# Patient Record
Sex: MALE | Race: OTHER | NOT HISPANIC OR LATINO | URBAN - METROPOLITAN AREA
[De-identification: names, ages, dates, MRNs, and addresses within clinical notes are randomized per-mention and may not be internally consistent; named-entity substitution may affect disease eponyms.]

---

## 2019-03-25 ENCOUNTER — INPATIENT (INPATIENT)
Facility: HOSPITAL | Age: 54
LOS: 0 days | Discharge: HOME | DRG: 639 | End: 2019-03-26
Attending: INTERNAL MEDICINE | Admitting: HOSPITALIST
Payer: COMMERCIAL

## 2019-03-25 VITALS
DIASTOLIC BLOOD PRESSURE: 76 MMHG | OXYGEN SATURATION: 95 % | TEMPERATURE: 98 F | WEIGHT: 149.91 LBS | SYSTOLIC BLOOD PRESSURE: 152 MMHG | HEART RATE: 87 BPM | RESPIRATION RATE: 16 BRPM | HEIGHT: 68 IN

## 2019-03-25 DIAGNOSIS — R73.9 HYPERGLYCEMIA, UNSPECIFIED: ICD-10-CM

## 2019-03-25 LAB
ALBUMIN SERPL ELPH-MCNC: 4.2 G/DL — SIGNIFICANT CHANGE UP (ref 3.3–5)
ALP SERPL-CCNC: 101 U/L — SIGNIFICANT CHANGE UP (ref 40–120)
ALT FLD-CCNC: 25 U/L — SIGNIFICANT CHANGE UP (ref 10–45)
ANION GAP SERPL CALC-SCNC: 14 MMOL/L — SIGNIFICANT CHANGE UP (ref 5–17)
APTT BLD: 27.8 SEC — SIGNIFICANT CHANGE UP (ref 27.5–36.3)
AST SERPL-CCNC: 18 U/L — SIGNIFICANT CHANGE UP (ref 10–40)
BASOPHILS # BLD AUTO: 0 K/UL — SIGNIFICANT CHANGE UP (ref 0–0.2)
BASOPHILS NFR BLD AUTO: 0.5 % — SIGNIFICANT CHANGE UP (ref 0–2)
BILIRUB SERPL-MCNC: 0.2 MG/DL — SIGNIFICANT CHANGE UP (ref 0.2–1.2)
BLD GP AB SCN SERPL QL: NEGATIVE — SIGNIFICANT CHANGE UP
BUN SERPL-MCNC: 12 MG/DL — SIGNIFICANT CHANGE UP (ref 7–23)
CALCIUM SERPL-MCNC: 9.5 MG/DL — SIGNIFICANT CHANGE UP (ref 8.4–10.5)
CHLORIDE SERPL-SCNC: 98 MMOL/L — SIGNIFICANT CHANGE UP (ref 96–108)
CO2 SERPL-SCNC: 23 MMOL/L — SIGNIFICANT CHANGE UP (ref 22–31)
CREAT SERPL-MCNC: 0.85 MG/DL — SIGNIFICANT CHANGE UP (ref 0.5–1.3)
EOSINOPHIL # BLD AUTO: 0.3 K/UL — SIGNIFICANT CHANGE UP (ref 0–0.5)
EOSINOPHIL NFR BLD AUTO: 2.4 % — SIGNIFICANT CHANGE UP (ref 0–6)
GLUCOSE SERPL-MCNC: 478 MG/DL — CRITICAL HIGH (ref 70–99)
HCT VFR BLD CALC: 43.3 % — SIGNIFICANT CHANGE UP (ref 39–50)
HGB BLD-MCNC: 14.7 G/DL — SIGNIFICANT CHANGE UP (ref 13–17)
INR BLD: 0.97 RATIO — SIGNIFICANT CHANGE UP (ref 0.88–1.16)
LYMPHOCYTES # BLD AUTO: 3.2 K/UL — SIGNIFICANT CHANGE UP (ref 1–3.3)
LYMPHOCYTES # BLD AUTO: 30.6 % — SIGNIFICANT CHANGE UP (ref 13–44)
MCHC RBC-ENTMCNC: 30.6 PG — SIGNIFICANT CHANGE UP (ref 27–34)
MCHC RBC-ENTMCNC: 34 GM/DL — SIGNIFICANT CHANGE UP (ref 32–36)
MCV RBC AUTO: 90.1 FL — SIGNIFICANT CHANGE UP (ref 80–100)
MONOCYTES # BLD AUTO: 0.4 K/UL — SIGNIFICANT CHANGE UP (ref 0–0.9)
MONOCYTES NFR BLD AUTO: 4.1 % — SIGNIFICANT CHANGE UP (ref 2–14)
NEUTROPHILS # BLD AUTO: 6.6 K/UL — SIGNIFICANT CHANGE UP (ref 1.8–7.4)
NEUTROPHILS NFR BLD AUTO: 62.4 % — SIGNIFICANT CHANGE UP (ref 43–77)
PLATELET # BLD AUTO: 215 K/UL — SIGNIFICANT CHANGE UP (ref 150–400)
POTASSIUM SERPL-MCNC: 4 MMOL/L — SIGNIFICANT CHANGE UP (ref 3.5–5.3)
POTASSIUM SERPL-SCNC: 4 MMOL/L — SIGNIFICANT CHANGE UP (ref 3.5–5.3)
PROT SERPL-MCNC: 7.5 G/DL — SIGNIFICANT CHANGE UP (ref 6–8.3)
PROTHROM AB SERPL-ACNC: 11.1 SEC — SIGNIFICANT CHANGE UP (ref 10–12.9)
RBC # BLD: 4.81 M/UL — SIGNIFICANT CHANGE UP (ref 4.2–5.8)
RBC # FLD: 11.6 % — SIGNIFICANT CHANGE UP (ref 10.3–14.5)
RH IG SCN BLD-IMP: POSITIVE — SIGNIFICANT CHANGE UP
SODIUM SERPL-SCNC: 135 MMOL/L — SIGNIFICANT CHANGE UP (ref 135–145)
WBC # BLD: 10.6 K/UL — HIGH (ref 3.8–10.5)
WBC # FLD AUTO: 10.6 K/UL — HIGH (ref 3.8–10.5)

## 2019-03-25 PROCEDURE — 70486 CT MAXILLOFACIAL W/O DYE: CPT | Mod: 26

## 2019-03-25 PROCEDURE — 70450 CT HEAD/BRAIN W/O DYE: CPT | Mod: 26

## 2019-03-25 PROCEDURE — 99285 EMERGENCY DEPT VISIT HI MDM: CPT | Mod: 25

## 2019-03-25 PROCEDURE — 74177 CT ABD & PELVIS W/CONTRAST: CPT | Mod: 26

## 2019-03-25 PROCEDURE — 71260 CT THORAX DX C+: CPT | Mod: 26

## 2019-03-25 PROCEDURE — 71046 X-RAY EXAM CHEST 2 VIEWS: CPT | Mod: 26

## 2019-03-25 PROCEDURE — 72128 CT CHEST SPINE W/O DYE: CPT | Mod: 26

## 2019-03-25 PROCEDURE — 93010 ELECTROCARDIOGRAM REPORT: CPT

## 2019-03-25 PROCEDURE — 72125 CT NECK SPINE W/O DYE: CPT | Mod: 26

## 2019-03-25 PROCEDURE — 76377 3D RENDER W/INTRP POSTPROCES: CPT | Mod: 26

## 2019-03-25 RX ORDER — SODIUM CHLORIDE 9 MG/ML
1000 INJECTION, SOLUTION INTRAVENOUS
Qty: 0 | Refills: 0 | Status: DISCONTINUED | OUTPATIENT
Start: 2019-03-25 | End: 2019-03-26

## 2019-03-25 RX ORDER — DEXTROSE 50 % IN WATER 50 %
25 SYRINGE (ML) INTRAVENOUS ONCE
Qty: 0 | Refills: 0 | Status: DISCONTINUED | OUTPATIENT
Start: 2019-03-25 | End: 2019-03-26

## 2019-03-25 RX ORDER — DEXTROSE 50 % IN WATER 50 %
15 SYRINGE (ML) INTRAVENOUS ONCE
Qty: 0 | Refills: 0 | Status: DISCONTINUED | OUTPATIENT
Start: 2019-03-25 | End: 2019-03-26

## 2019-03-25 RX ORDER — INSULIN GLARGINE 100 [IU]/ML
13 INJECTION, SOLUTION SUBCUTANEOUS ONCE
Qty: 0 | Refills: 0 | Status: COMPLETED | OUTPATIENT
Start: 2019-03-25 | End: 2019-03-26

## 2019-03-25 RX ORDER — DEXTROSE 50 % IN WATER 50 %
12.5 SYRINGE (ML) INTRAVENOUS ONCE
Qty: 0 | Refills: 0 | Status: DISCONTINUED | OUTPATIENT
Start: 2019-03-25 | End: 2019-03-26

## 2019-03-25 RX ORDER — ACETAMINOPHEN 500 MG
975 TABLET ORAL ONCE
Qty: 0 | Refills: 0 | Status: COMPLETED | OUTPATIENT
Start: 2019-03-25 | End: 2019-03-25

## 2019-03-25 RX ORDER — GLUCAGON INJECTION, SOLUTION 0.5 MG/.1ML
1 INJECTION, SOLUTION SUBCUTANEOUS ONCE
Qty: 0 | Refills: 0 | Status: DISCONTINUED | OUTPATIENT
Start: 2019-03-25 | End: 2019-03-26

## 2019-03-25 RX ORDER — SODIUM CHLORIDE 9 MG/ML
1000 INJECTION, SOLUTION INTRAVENOUS ONCE
Qty: 0 | Refills: 0 | Status: COMPLETED | OUTPATIENT
Start: 2019-03-25 | End: 2019-03-25

## 2019-03-25 RX ADMIN — SODIUM CHLORIDE 1000 MILLILITER(S): 9 INJECTION, SOLUTION INTRAVENOUS at 22:00

## 2019-03-25 RX ADMIN — SODIUM CHLORIDE 1000 MILLILITER(S): 9 INJECTION, SOLUTION INTRAVENOUS at 19:27

## 2019-03-25 RX ADMIN — Medication 975 MILLIGRAM(S): at 17:53

## 2019-03-25 NOTE — ED PROVIDER NOTE - PROGRESS NOTE DETAILS
Lissette Estrada, resident MD: pt has elevated glucose 478. will give 1L fluids and recheck. will contact endo for outpt follow up. pt aware that he may have diabetes and requires outpt f/u and will give him prescription for metformin. Lissette Estrada, resident MD: pt has elevated glucose 478. will give 1L fluids and recheck. will contact endo for outpt follow up. pt aware that he may have diabetes. Lissette Estrada, resident MD: CT reveals sinusitis, will start pt on abx. discussed hyperglycemia with endocrine, no option for observation at this time, and they recommend admission for further glycemic control and monitoring. will start pt on lantus 13 units per endo recommendations. no concerns for trauma with negative CTs and no need for trauma consult at this time. will admit for further glycemic management and sinusitis.

## 2019-03-25 NOTE — ED PROVIDER NOTE - PHYSICAL EXAMINATION
General: well-appearing elderly man in no acute distress  Head: normocephalic, atraumatic, no facial laxity with pressure  Eyes: PERRL  Ears: no discharge, TMs intact bilaterally  Mouth: no missing teeth, no chipped teeth, moist mucous membranes, no oropharyngeal erythema, tongue and uvula midline  Neck: supple neck, no lymphadenopathy, no Cspine tenderness to palpation, tenderness to palpation of lateral neck bilaterally  Chest: normal rate and rhythm, normal S1 and S2, erythema of right chest and midchest, no ecchymosis or lacerations, no evidence of seatbelt sign  Respiratory: clear to auscultation bilaterally  Abdomen: soft, nontender, nondistended  Back: tenderness to palpation of thoracic midline spine, no CVAT  Neuro: alert and answering questions appropriately, speech clear, strength 5/5 UE and LE bilaterally, sensation equal and intact bilaterally, gait intact  Extremities: no edema, full ROM of all extremity joints, no obvious deformity, peripheral pulses 2+ bilaterally
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
## 2019-03-25 NOTE — ED ADULT NURSE NOTE - NSIMPLEMENTINTERV_GEN_ALL_ED
Implemented All Universal Safety Interventions:  [employer] to call system. Call bell, personal items and telephone within reach. Instruct patient to call for assistance. Room bathroom lighting operational. Non-slip footwear when patient is off stretcher. Physically safe environment: no spills, clutter or unnecessary equipment. Stretcher in lowest position, wheels locked, appropriate side rails in place.

## 2019-03-25 NOTE — ED ADULT NURSE NOTE - CHPI ED NUR SYMPTOMS NEG
no fussiness/no decreased eating/drinking/no disorientation/no dizziness/no headache/no loss of consciousness/no difficulty bearing weight/no crying/no back pain/no bruising/no acting out behaviors/no laceration/no sleeping issues/no neck tenderness

## 2019-03-25 NOTE — ED ADULT NURSE REASSESSMENT NOTE - NS ED NURSE REASSESS COMMENT FT1
Pt received 1900 from JAKUB Lackey. VSS/ NAD. Safety and comfort maintained. Family at bedside. Awaiting dispo. Will continue to monitor.
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
## 2019-03-25 NOTE — ED ADULT NURSE NOTE - BREATH SOUNDS, MLM
Clear
Breast Cancer BRCA-1 in RM, Epilepsy, COPD, Pre-diabetic, migraines, bilateral calcification of kidneys and asthma.

## 2019-03-25 NOTE — ED ADULT NURSE NOTE - CAS EDN DISCHARGE ASSESSMENT
No adverse reaction to first time med in ED/Awake/Alert and oriented to person, place and time/Patient baseline mental status

## 2019-03-26 ENCOUNTER — TRANSCRIPTION ENCOUNTER (OUTPATIENT)
Age: 54
End: 2019-03-26

## 2019-03-26 VITALS
DIASTOLIC BLOOD PRESSURE: 68 MMHG | SYSTOLIC BLOOD PRESSURE: 155 MMHG | TEMPERATURE: 99 F | HEART RATE: 60 BPM | OXYGEN SATURATION: 97 % | RESPIRATION RATE: 18 BRPM

## 2019-03-26 DIAGNOSIS — R73.9 HYPERGLYCEMIA, UNSPECIFIED: ICD-10-CM
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
## 2019-03-26 DIAGNOSIS — E78.5 HYPERLIPIDEMIA, UNSPECIFIED: ICD-10-CM

## 2019-03-26 DIAGNOSIS — I10 ESSENTIAL (PRIMARY) HYPERTENSION: ICD-10-CM

## 2019-03-26 DIAGNOSIS — E11.9 TYPE 2 DIABETES MELLITUS WITHOUT COMPLICATIONS: ICD-10-CM

## 2019-03-26 PROBLEM — Z00.00 ENCOUNTER FOR PREVENTIVE HEALTH EXAMINATION: Status: ACTIVE | Noted: 2019-03-26

## 2019-03-26 LAB
GLUCOSE BLDC GLUCOMTR-MCNC: 199 MG/DL — HIGH (ref 70–99)
GLUCOSE BLDC GLUCOMTR-MCNC: 236 MG/DL — HIGH (ref 70–99)
GLUCOSE BLDC GLUCOMTR-MCNC: 242 MG/DL — HIGH (ref 70–99)
GLUCOSE BLDC GLUCOMTR-MCNC: 283 MG/DL — HIGH (ref 70–99)
GLUCOSE BLDC GLUCOMTR-MCNC: 304 MG/DL — HIGH (ref 70–99)

## 2019-03-26 PROCEDURE — 99223 1ST HOSP IP/OBS HIGH 75: CPT

## 2019-03-26 PROCEDURE — 99223 1ST HOSP IP/OBS HIGH 75: CPT | Mod: GC

## 2019-03-26 RX ORDER — KETOROLAC TROMETHAMINE 30 MG/ML
15 SYRINGE (ML) INJECTION EVERY 6 HOURS
Qty: 0 | Refills: 0 | Status: DISCONTINUED | OUTPATIENT
Start: 2019-03-26 | End: 2019-03-26

## 2019-03-26 RX ORDER — SODIUM CHLORIDE 9 MG/ML
1000 INJECTION, SOLUTION INTRAVENOUS
Qty: 0 | Refills: 0 | Status: DISCONTINUED | OUTPATIENT
Start: 2019-03-26 | End: 2019-03-26

## 2019-03-26 RX ORDER — METFORMIN HYDROCHLORIDE 850 MG/1
1 TABLET ORAL
Qty: 100 | Refills: 0 | OUTPATIENT
Start: 2019-03-26 | End: 2019-04-24

## 2019-03-26 RX ORDER — GLIMEPIRIDE 1 MG
1 TABLET ORAL
Qty: 30 | Refills: 0 | OUTPATIENT
Start: 2019-03-26 | End: 2019-04-24

## 2019-03-26 RX ORDER — METFORMIN HYDROCHLORIDE 850 MG/1
1 TABLET ORAL
Qty: 60 | Refills: 0 | OUTPATIENT
Start: 2019-03-26 | End: 2019-04-24

## 2019-03-26 RX ORDER — INSULIN GLARGINE 100 [IU]/ML
13 INJECTION, SOLUTION SUBCUTANEOUS AT BEDTIME
Qty: 0 | Refills: 0 | Status: DISCONTINUED | OUTPATIENT
Start: 2019-03-26 | End: 2019-03-26

## 2019-03-26 RX ORDER — INSULIN LISPRO 100/ML
VIAL (ML) SUBCUTANEOUS
Qty: 0 | Refills: 0 | Status: DISCONTINUED | OUTPATIENT
Start: 2019-03-26 | End: 2019-03-26

## 2019-03-26 RX ORDER — ACETAMINOPHEN 500 MG
2 TABLET ORAL
Qty: 240 | Refills: 0 | OUTPATIENT
Start: 2019-03-26 | End: 2019-04-24

## 2019-03-26 RX ORDER — INFLUENZA VIRUS VACCINE 15; 15; 15; 15 UG/.5ML; UG/.5ML; UG/.5ML; UG/.5ML
0.5 SUSPENSION INTRAMUSCULAR ONCE
Qty: 0 | Refills: 0 | Status: COMPLETED | OUTPATIENT
Start: 2019-03-26 | End: 2019-03-26

## 2019-03-26 RX ORDER — METFORMIN HYDROCHLORIDE 850 MG/1
1 TABLET ORAL
Qty: 100 | Refills: 0 | OUTPATIENT
Start: 2019-03-26

## 2019-03-26 RX ORDER — INSULIN LISPRO 100/ML
VIAL (ML) SUBCUTANEOUS AT BEDTIME
Qty: 0 | Refills: 0 | Status: DISCONTINUED | OUTPATIENT
Start: 2019-03-26 | End: 2019-03-26

## 2019-03-26 RX ORDER — METFORMIN HYDROCHLORIDE 850 MG/1
2 TABLET ORAL
Qty: 120 | Refills: 0 | OUTPATIENT
Start: 2019-03-26 | End: 2019-04-24

## 2019-03-26 RX ADMIN — INFLUENZA VIRUS VACCINE 0.5 MILLILITER(S): 15; 15; 15; 15 SUSPENSION INTRAMUSCULAR at 17:09

## 2019-03-26 RX ADMIN — Medication 15 MILLIGRAM(S): at 16:45

## 2019-03-26 RX ADMIN — Medication 3: at 18:36

## 2019-03-26 RX ADMIN — Medication 15 MILLIGRAM(S): at 16:27

## 2019-03-26 RX ADMIN — Medication 2: at 09:28

## 2019-03-26 RX ADMIN — Medication 15 MILLIGRAM(S): at 08:05

## 2019-03-26 RX ADMIN — Medication 1: at 13:30

## 2019-03-26 RX ADMIN — Medication 15 MILLIGRAM(S): at 08:25

## 2019-03-26 RX ADMIN — INSULIN GLARGINE 13 UNIT(S): 100 INJECTION, SOLUTION SUBCUTANEOUS at 00:32

## 2019-03-26 NOTE — H&P ADULT - NSHPLABSRESULTS_GEN_ALL_CORE
14.7   10.6  )-----------( 215      ( 25 Mar 2019 17:50 )             43.3       03-25    135  |  98  |  12  ----------------------------<  478<HH>  4.0   |  23  |  0.85    Ca    9.5      25 Mar 2019 17:50    TPro  7.5  /  Alb  4.2  /  TBili  0.2  /  DBili  x   /  AST  18  /  ALT  25  /  AlkPhos  101  03-25          CAPILLARY BLOOD GLUCOSE      POCT Blood Glucose.: 304 mg/dL (26 Mar 2019 00:27)      PT/INR - ( 25 Mar 2019 17:50 )   PT: 11.1 sec;   INR: 0.97 ratio         PTT - ( 25 Mar 2019 17:50 )  PTT:27.8 sec    Lactate Trend    I personally reviewed & interpreted the lab findings above;  I personally reviewed & interpreted the radiographic findings;  I personally reviewed & interpreted the EKG findings;

## 2019-03-26 NOTE — DISCHARGE NOTE PROVIDER - PROVIDER TOKENS
FREE:[LAST:[Eldon],FIRST:[Jose],PHONE:[phone]],FAX:[(   )    -],ADDRESS:[[address] [place] [address]],FOLLOWUP:[1 month]]

## 2019-03-26 NOTE — DIETITIAN INITIAL EVALUATION ADULT. - ORAL INTAKE PTA
good/Pt reports good po intake/appetite PTA. Typical meal intake: Oatmeal, homemade bread and rice. Lunch: Rice vegetables and fish. Dinner: Similar to lunch meal. Pt drinks mostly water, denies regular intake of juices or sodas. Denies EtOH use. Denies food allergies or intolerance. Denies taking vitamin/mineral/herbal supplements PTA.
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
## 2019-03-26 NOTE — DIETITIAN INITIAL EVALUATION ADULT. - NS AS NUTRI INTERV MEALS SNACK
Carbohydrate - modified diet/Continue current consistent CHO no snacks diet to promote glucose control./General/healthful diet
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
## 2019-03-26 NOTE — CONSULT NOTE ADULT - PROBLEM SELECTOR RECOMMENDATION 9
Pt with newly diagnosed DM, likely type 2, without evidence of complications from hyperglycemia.  - Increase Lantus to 17u at bedtime  - Start pt on standing premeal Humalog 5u  - C/w low dose sliding scale  - Consistent carbohydrate diet  - Appreciate dietician consult  - Will need diabetic teaching for insulin administration prior to discharge  - Will need outpatient endocrinology follow-up but is requesting clinic closer to his home in [place]. Outpatient recs to follow.    **To be discussed with Attending**    Roya Donato MD  PGY 2, Medicine

## 2019-03-26 NOTE — DIETITIAN INITIAL EVALUATION ADULT. - NS AS NUTRI INTERV ED CONTENT
Extensive diabetes diet and management education provided to pt. Discussed the following: Basic pathophysiology of DM, importance of DM management at preventing complications, SMBG, general goal FS ranges ,hypoglycemia s/s/treatment/ and prevention of hypoglycemia, consistent CHO diet recommendations, review of CHO food/beverage sources, appropriate CHO portion sizes, MyPlate style of portion control, recommended CHO servings per meals and snacks, physical activity recommendations, to consider moderate weight loss 5-10 pounds to UBW to promote overall improved metabolic profile. Pt voiced understanding and accepted T2DM nutrition therapy handout, My Diabetes Self-Management Individualized handout for reference at his leisure. Extensive diabetes diet and management education provided to pt. Discussed the following: Basic pathophysiology of DM, importance of DM management at preventing complications, SMBG, general goal FS ranges ,hypoglycemia s/s/treatment/ and prevention of hypoglycemia, hyperglycemia problem solving, consistent CHO diet recommendations, review of CHO food/beverage sources, appropriate CHO portion sizes, MyPlate style of portion control, recommended CHO servings per meals and snacks, physical activity recommendations, to consider moderate weight loss 5-10 pounds to UBW to promote overall improved metabolic profile. Pt voiced understanding and accepted T2DM nutrition therapy handout, My Diabetes Self-Management Individualized handout for reference at his leisure.

## 2019-03-26 NOTE — CONSULT NOTE ADULT - ATTENDING COMMENTS
Patient seen and examined. Agree with note as above with addendum: patient was under-dosed for his hyperglycemia with insulin. He has a BMI of 22.8 so he is mildly overweight as he is of  descent. He can be discharged on metformin up-titration to 1000mg po bid and glimepiride 2mg po qdaily. We discussed that he should not go more than 6 hours without eating. 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 Primary team to get him a f/u appt at [address] [place] [address] and a meter.  Amanda Jean MD  Pager: [phone]  Nights and Weekends: [phone]

## 2019-03-26 NOTE — H&P ADULT - PROBLEM SELECTOR PLAN 2
-No signs/sx of cord compression  -mild neck pain but ROM intact. CT c-spine negative  -toradol prn for pain  -ambulating without assistance, no PT eval.
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
## 2019-03-26 NOTE — DIETITIAN INITIAL EVALUATION ADULT. - ADHERENCE
n/a/Pt did not adhere to a particular modified diet PTA. Pt did not adhere to a particular modified diet PTA. Per diet recall pt consuming CHO-heavy meals./n/a

## 2019-03-26 NOTE — DISCHARGE NOTE PROVIDER - NSDCFUADDAPPT_GEN_ALL_CORE_FT
You have a scheduled appointment on April 23, 2019 at 1:30 pm please call [phone] if you are unable to make this appointment   Also please bring with you: your insurance info, ID , medical hx, medication list

## 2019-03-26 NOTE — H&P ADULT - PROBLEM SELECTOR PLAN 1
-Newly diagnosed diabetic. A1c is 12.2. ER contacted Endocrine and recommend medical admission. Endo recommended 13 units lantus (given). Will add low-dose humalog sliding scale TID and bedtime. Likely type 2.   -No evidence of DKA or HONK state  -Please follow up endo tomorrow for evaluation  -Will need outpatient endocrine apt and medical optimization of pharmacotherapy   -I provided detail diabetes education but pt will benefit from brochure and further reinforcement prior to discharge.

## 2019-03-26 NOTE — DISCHARGE NOTE NURSING/CASE MANAGEMENT/SOCIAL WORK - NSDCFUADDAPPT_GEN_ALL_CORE_FT
You have a scheduled appointment on April 23, 2019 at 1:30 pm please call [phone] if you are unable to make this appointment   Also please bring with you: your insurance info, ID , medical hx, medication list
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
## 2019-03-26 NOTE — DISCHARGE NOTE NURSING/CASE MANAGEMENT/SOCIAL WORK - NSDCDPATPORTLINK_GEN_ALL_CORE
You can access the [facility], offered by [facility], by registering with the following website: [email or web address]

## 2019-03-26 NOTE — DISCHARGE NOTE NURSING/CASE MANAGEMENT/SOCIAL WORK - NSDCVIVACCINE_GEN_ALL_CORE_FT
----- Message from Lina Drake sent at 6/26/2018  9:35 AM CDT -----  Type: Needs Medical Advice    Who Called: daughter- Anna Julian  Symptoms (please be specific):  Na  How long has patient had these symptoms:  Terri  Pharmacy name and phone #: Terri  Best Call Back Number: [phone] (home)     Additional Information: Send some information such as a report for the patient's bad arthritis, Dr. Noel fax # [phone]    
Spoke to patients daughter sending office notes to PCP, per request.  
Influenza , 2019/3/26 17:09 , Jerzy Meehan (JAKUB)
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
## 2019-03-26 NOTE — DIETITIAN INITIAL EVALUATION ADULT. - NS AS NUTRI INTERV COLLABORAT
Pending endocrine consult; glucose testing supplies and insulin teaching (if applicable) prior to d/c. Team aware per discussion during rounds.

## 2019-03-26 NOTE — H&P ADULT - NSHPPHYSICALEXAM_GEN_ALL_CORE
PHYSICAL EXAM:  Vital Signs Last 24 Hrs  T(C): 36.3 (03-26-19 @ 04:44)  T(F): 97.4 (03-26-19 @ 04:44), Max: 97.9 (03-25-19 @ 15:41)  HR: 64 (03-26-19 @ 04:44) (64 - 87)  BP: 136/78 (03-26-19 @ 04:44)  BP(mean): --  RR: 18 (03-26-19 @ 04:44) (16 - 18)  SpO2: 97% (03-26-19 @ 04:44) (95% - 97%)  Wt(kg): --    Constitutional: NAD, awake and alert  EYES: EOMI  ENT:  Normal Hearing, no tonsillar exudates   Neck: Soft and supple , no thyromegaly   Respiratory: Breath sounds are clear bilaterally, No wheezing, rales or rhonchi  Cardiovascular: S1 and S2, regular rate and rhythm, no Murmurs, gallops or rubs, no JVD,    Gastrointestinal: Bowel Sounds present, soft, nontender, nondistended, no guarding, no rebound  Extremities: No cyanosis or clubbing; warm to touch  Vascular: 2+ peripheral pulses lower ex  Neurological: No focal deficits, CN II-XII intact bilaterally, sensation to light touch intact in all extremities, gait intact. Pupils are equally reactive to light and symmetrical in size. ROM of neck intact. No concern for cord injury. Previously had point tenderness to thoracic spine per ER exam but not appreciable on my exam.   Musculoskeletal: 5/5 strength b/l upper and lower extremities; no joint swelling.  Skin: No rashes, no chest wall bruising or seat belt sign.   Psych: no depression or anhedonia, AAOx3  HEME: no bruises, no nose bleeds visible.

## 2019-03-26 NOTE — H&P ADULT - NSHPREVIEWOFSYSTEMS_GEN_ALL_CORE
REVIEW OF SYSTEMS:    CONSTITUTIONAL: No weakness, fevers or chills  ENMT:  No ear pain, No vertigo or throat pain  EYES: No visual changes  or photophobia  NECK: No pain or stiffness  RESPIRATORY: No cough, wheezing, hemoptysis; No shortness of breath  CARDIOVASCULAR: +CP, nonexertional, no pressure which has now resolved since arrival.   GASTROINTESTINAL: No abdominal or epigastric pain. No nausea, vomiting, or hematemesis; No diarrhea or constipation. No melena or hematochezia.  MUSCULOSKELETAL: No joint swelling  or warmth.  GENITOURINARY: No dysuria, frequency or hematuria  NEUROLOGICAL: No numbness or weakness. No motor deficits. +neck pain  PSYCHIATRIC: No depression or anhedonia.  ENDOCRINE: No sx hypoglycemic episodes.  SKIN: No itching, burning, rashes, or lesions

## 2019-03-27 PROBLEM — Z78.9 OTHER SPECIFIED HEALTH STATUS: Chronic | Status: ACTIVE | Noted: 2019-03-25

## 2019-04-23 ENCOUNTER — APPOINTMENT (OUTPATIENT)
Dept: INTERNAL MEDICINE | Facility: CLINIC | Age: 54
End: 2019-04-23

## 2019-06-18 NOTE — PATIENT PROFILE ADULT - NSPROEDAABILITYLEARN_GEN_A_NUR
Problem: Infection  Goal: Will remain free from infection  Outcome: PROGRESSING AS EXPECTED  Pt remains afebrile and no s&s of infection.  Antibiotics prescribed.    Problem: Mobility  Goal: Risk for activity intolerance will decrease  Outcome: PROGRESSING AS EXPECTED  Pt verbalizes understanding of importance of calling for assistance when needing to get up.  Pt agrees to ambulate in the morning with assistance.       none

## 2019-07-10 PROCEDURE — 83036 HEMOGLOBIN GLYCOSYLATED A1C: CPT

## 2019-07-10 PROCEDURE — 93005 ELECTROCARDIOGRAM TRACING: CPT

## 2019-07-10 PROCEDURE — 85730 THROMBOPLASTIN TIME PARTIAL: CPT

## 2019-07-10 PROCEDURE — 76377 3D RENDER W/INTRP POSTPROCES: CPT

## 2019-07-10 PROCEDURE — 85610 PROTHROMBIN TIME: CPT

## 2019-07-10 PROCEDURE — 72125 CT NECK SPINE W/O DYE: CPT

## 2019-07-10 PROCEDURE — 71046 X-RAY EXAM CHEST 2 VIEWS: CPT

## 2019-07-10 PROCEDURE — 86901 BLOOD TYPING SEROLOGIC RH(D): CPT

## 2019-07-10 PROCEDURE — 86900 BLOOD TYPING SEROLOGIC ABO: CPT

## 2019-07-10 PROCEDURE — 99285 EMERGENCY DEPT VISIT HI MDM: CPT | Mod: 25

## 2019-07-10 PROCEDURE — 82962 GLUCOSE BLOOD TEST: CPT

## 2019-07-10 PROCEDURE — 96361 HYDRATE IV INFUSION ADD-ON: CPT

## 2019-07-10 PROCEDURE — 85027 COMPLETE CBC AUTOMATED: CPT

## 2019-07-10 PROCEDURE — 70450 CT HEAD/BRAIN W/O DYE: CPT

## 2019-07-10 PROCEDURE — 71260 CT THORAX DX C+: CPT

## 2019-07-10 PROCEDURE — 80053 COMPREHEN METABOLIC PANEL: CPT

## 2019-07-10 PROCEDURE — 70486 CT MAXILLOFACIAL W/O DYE: CPT

## 2019-07-10 PROCEDURE — 90686 IIV4 VACC NO PRSV 0.5 ML IM: CPT

## 2019-07-10 PROCEDURE — 86850 RBC ANTIBODY SCREEN: CPT

## 2019-07-10 PROCEDURE — 96360 HYDRATION IV INFUSION INIT: CPT

## 2019-07-10 PROCEDURE — 74177 CT ABD & PELVIS W/CONTRAST: CPT

## 2025-04-03 NOTE — ED ADULT NURSE NOTE - CHPI ED NUR QUALITY2
Procedure completed, pt tolerated without s/sx of complication. TR band applied, 13cc of air injected, CDI. Patient to be transferred to PACU  for recovery. Report called to JUAN J Pacheco.  
aching